# Patient Record
Sex: FEMALE | Race: BLACK OR AFRICAN AMERICAN | NOT HISPANIC OR LATINO | Employment: FULL TIME | URBAN - METROPOLITAN AREA
[De-identification: names, ages, dates, MRNs, and addresses within clinical notes are randomized per-mention and may not be internally consistent; named-entity substitution may affect disease eponyms.]

---

## 2020-12-18 ENCOUNTER — INITIAL PRENATAL (OUTPATIENT)
Dept: OBSTETRICS AND GYNECOLOGY | Facility: CLINIC | Age: 30
End: 2020-12-18

## 2020-12-18 VITALS — DIASTOLIC BLOOD PRESSURE: 73 MMHG | WEIGHT: 166 LBS | SYSTOLIC BLOOD PRESSURE: 110 MMHG

## 2020-12-18 DIAGNOSIS — Z34.90 PREGNANCY, UNSPECIFIED GESTATIONAL AGE: Primary | ICD-10-CM

## 2020-12-18 DIAGNOSIS — O21.9 NAUSEA AND VOMITING OF PREGNANCY, ANTEPARTUM: ICD-10-CM

## 2020-12-18 LAB
B-HCG UR QL: POSITIVE
GLUCOSE UR STRIP-MCNC: NEGATIVE MG/DL
INTERNAL NEGATIVE CONTROL: NEGATIVE
INTERNAL POSITIVE CONTROL: POSITIVE
Lab: ABNORMAL
PROT UR STRIP-MCNC: NEGATIVE MG/DL

## 2020-12-18 PROCEDURE — 0501F PRENATAL FLOW SHEET: CPT | Performed by: STUDENT IN AN ORGANIZED HEALTH CARE EDUCATION/TRAINING PROGRAM

## 2020-12-18 PROCEDURE — 81025 URINE PREGNANCY TEST: CPT | Performed by: STUDENT IN AN ORGANIZED HEALTH CARE EDUCATION/TRAINING PROGRAM

## 2020-12-18 RX ORDER — PROMETHAZINE HYDROCHLORIDE 25 MG/1
25 TABLET ORAL EVERY 6 HOURS PRN
Qty: 30 TABLET | Refills: 1 | Status: SHIPPED | OUTPATIENT
Start: 2020-12-18 | End: 2021-02-11

## 2020-12-18 RX ORDER — PNV NO.95/FERROUS FUM/FOLIC AC 28MG-0.8MG
1 TABLET ORAL DAILY
Qty: 30 TABLET | Refills: 11 | Status: SHIPPED | OUTPATIENT
Start: 2020-12-18 | End: 2021-02-11

## 2020-12-19 LAB
ABO GROUP BLD: NORMAL
BASOPHILS # BLD AUTO: 0 X10E3/UL (ref 0–0.2)
BASOPHILS NFR BLD AUTO: 0 %
BLD GP AB SCN SERPL QL: NEGATIVE
EOSINOPHIL # BLD AUTO: 0.1 X10E3/UL (ref 0–0.4)
EOSINOPHIL NFR BLD AUTO: 1 %
ERYTHROCYTE [DISTWIDTH] IN BLOOD BY AUTOMATED COUNT: 12.6 % (ref 11.7–15.4)
HBV SURFACE AG SERPL QL IA: NEGATIVE
HCT VFR BLD AUTO: 37.5 % (ref 34–46.6)
HCV AB S/CO SERPL IA: <0.1 S/CO RATIO (ref 0–0.9)
HGB BLD-MCNC: 12.7 G/DL (ref 11.1–15.9)
HIV 1+2 AB+HIV1 P24 AG SERPL QL IA: NON REACTIVE
IMM GRANULOCYTES # BLD AUTO: 0 X10E3/UL (ref 0–0.1)
IMM GRANULOCYTES NFR BLD AUTO: 0 %
LYMPHOCYTES # BLD AUTO: 2.5 X10E3/UL (ref 0.7–3.1)
LYMPHOCYTES NFR BLD AUTO: 38 %
MCH RBC QN AUTO: 29 PG (ref 26.6–33)
MCHC RBC AUTO-ENTMCNC: 33.9 G/DL (ref 31.5–35.7)
MCV RBC AUTO: 86 FL (ref 79–97)
MONOCYTES # BLD AUTO: 0.6 X10E3/UL (ref 0.1–0.9)
MONOCYTES NFR BLD AUTO: 9 %
NEUTROPHILS # BLD AUTO: 3.4 X10E3/UL (ref 1.4–7)
NEUTROPHILS NFR BLD AUTO: 52 %
PLATELET # BLD AUTO: 280 X10E3/UL (ref 150–450)
RBC # BLD AUTO: 4.38 X10E6/UL (ref 3.77–5.28)
RH BLD: POSITIVE
RPR SER QL: NON REACTIVE
RUBV IGG SERPL IA-ACNC: 9.12 INDEX
TSH SERPL DL<=0.005 MIU/L-ACNC: 0.38 UIU/ML (ref 0.45–4.5)
VZV IGG SER IA-ACNC: 1311 INDEX
WBC # BLD AUTO: 6.5 X10E3/UL (ref 3.4–10.8)

## 2020-12-20 LAB
BACTERIA UR CULT: ABNORMAL
BACTERIA UR CULT: ABNORMAL

## 2020-12-21 LAB
AMPHETAMINES UR QL SCN: NEGATIVE NG/ML
BARBITURATES UR QL SCN: NEGATIVE NG/ML
BENZODIAZ UR QL SCN: NEGATIVE NG/ML
BZE UR QL SCN: NEGATIVE NG/ML
CANNABINOIDS UR QL SCN: NEGATIVE NG/ML
CREAT UR-MCNC: 39.1 MG/DL (ref 20–300)
LABORATORY COMMENT REPORT: NORMAL
METHADONE UR QL SCN: NEGATIVE NG/ML
OPIATES UR QL SCN: NEGATIVE NG/ML
OXYCODONE+OXYMORPHONE UR QL SCN: NEGATIVE NG/ML
PCP UR QL: NEGATIVE NG/ML
PH UR: 6.5 [PH] (ref 4.5–8.9)
PROPOXYPH UR QL SCN: NEGATIVE NG/ML

## 2020-12-22 DIAGNOSIS — R79.89 LOW TSH LEVEL: Primary | ICD-10-CM

## 2020-12-22 LAB
C TRACH RRNA CVX QL NAA+PROBE: NEGATIVE
CYTOLOGIST CVX/VAG CYTO: NORMAL
CYTOLOGY CVX/VAG DOC CYTO: NORMAL
CYTOLOGY CVX/VAG DOC THIN PREP: NORMAL
DX ICD CODE: NORMAL
HIV 1 & 2 AB SER-IMP: NORMAL
HPV I/H RISK 4 DNA CVX QL PROBE+SIG AMP: NEGATIVE
N GONORRHOEA RRNA CVX QL NAA+PROBE: NEGATIVE
OTHER STN SPEC: NORMAL
STAT OF ADQ CVX/VAG CYTO-IMP: NORMAL

## 2020-12-31 PROBLEM — O21.9 NAUSEA AND VOMITING OF PREGNANCY, ANTEPARTUM: Status: ACTIVE | Noted: 2020-12-18

## 2020-12-31 PROBLEM — Z34.90 PREGNANCY: Status: ACTIVE | Noted: 2020-12-18

## 2021-01-04 ENCOUNTER — TELEPHONE (OUTPATIENT)
Dept: OBSTETRICS AND GYNECOLOGY | Facility: CLINIC | Age: 31
End: 2021-01-04

## 2021-01-04 DIAGNOSIS — O23.41 GROUP B STREPTOCOCCUS URINARY TRACT INFECTION AFFECTING PREGNANCY IN FIRST TRIMESTER: Primary | ICD-10-CM

## 2021-01-04 DIAGNOSIS — B95.1 GROUP B STREPTOCOCCUS URINARY TRACT INFECTION AFFECTING PREGNANCY IN FIRST TRIMESTER: Primary | ICD-10-CM

## 2021-01-04 RX ORDER — AMOXICILLIN 500 MG/1
500 CAPSULE ORAL 2 TIMES DAILY
Qty: 14 CAPSULE | Refills: 0 | Status: SHIPPED | OUTPATIENT
Start: 2021-01-04 | End: 2021-01-11

## 2021-01-04 NOTE — TELEPHONE ENCOUNTER
Attempted to contact patient regarding new OB labs but no answer. Left voicemail that the patient should call the office for results but her urine culture was positive for a UTI and a prescription for antibiotics was sent to her pharmacy. Will call back at later time.    Meliza Campos MD  1/4/2021  15:06 EST

## 2021-01-04 NOTE — TELEPHONE ENCOUNTER
Called patient regarding results from recent appointment using a Slovenian . Reviewed results from visit and discussed that she had a UTI needing treatment with amoxicillin 500 mg BID x 7 days. She also had a low TSH and was advised to come to the clinic for a blood draw for a free T4 level. Patient indicated understanding and all questions and concerns answered.    Meliza Campos MD  1/4/2021  15:22 EST

## 2021-01-14 ENCOUNTER — ROUTINE PRENATAL (OUTPATIENT)
Dept: OBSTETRICS AND GYNECOLOGY | Facility: CLINIC | Age: 31
End: 2021-01-14

## 2021-01-14 VITALS — WEIGHT: 166 LBS | DIASTOLIC BLOOD PRESSURE: 70 MMHG | SYSTOLIC BLOOD PRESSURE: 110 MMHG

## 2021-01-14 DIAGNOSIS — E05.90 SUBCLINICAL HYPERTHYROIDISM: ICD-10-CM

## 2021-01-14 DIAGNOSIS — O09.891 HISTORY OF GBS (GROUP B STREPTOCOCCUS) UTI, CURRENTLY PREGNANT, FIRST TRIMESTER: ICD-10-CM

## 2021-01-14 DIAGNOSIS — Z3A.12 12 WEEKS GESTATION OF PREGNANCY: Primary | ICD-10-CM

## 2021-01-14 DIAGNOSIS — Z87.440 HISTORY OF GBS (GROUP B STREPTOCOCCUS) UTI, CURRENTLY PREGNANT, FIRST TRIMESTER: ICD-10-CM

## 2021-01-14 DIAGNOSIS — Z34.91 NORMAL PREGNANCY, FIRST TRIMESTER: ICD-10-CM

## 2021-01-14 LAB
GLUCOSE UR STRIP-MCNC: NEGATIVE MG/DL
PROT UR STRIP-MCNC: ABNORMAL MG/DL

## 2021-01-14 PROCEDURE — 0502F SUBSEQUENT PRENATAL CARE: CPT | Performed by: STUDENT IN AN ORGANIZED HEALTH CARE EDUCATION/TRAINING PROGRAM

## 2021-01-14 NOTE — PROGRESS NOTES
Chief Complaint   Patient presents with   • Routine Prenatal Visit      Zulma Cordero is a 30 y.o.  at 12w6d who presents for routine prenatal visit. She has no complaints today. She denies vaginal bleeding, abdominal cramping, leakage of fluid. She has not yet felt fetal movement    /70   Wt 75.3 kg (166 lb)   LMP  (LMP Unknown)    Gen: well appearing, NAD  Abd: gravid, nontender  Ext: no lower extremity edema   See OB Flowsheet    ASSESSMENT:   1. IUP at 12w6d   2. Subclinical hyperthyroidism    3. History of UTI in pregnancy     PLAN:  See updated Problem List   Urine culture ordered for test of cure following UTI on initial urine culture.   Reviewed new OB labs with patient including low TSH and normal free T4 suggestive of subclinical hyperthyroidism. Recommend repeat TSH/free T4 every trimester.   Patient desires genetic testing with cell free DNA and discussed that her insurance may not cover testing. Patient desires testing to be ordered and will see if it is covered and if not the cost.   First trimester bleeding/pain precautions reviewed.  Return in about 4 weeks (around 2021) for prenatal visit .      Raissa [id number] American  used during encounter.    Meliza Campos MD

## 2021-01-16 LAB
BACTERIA UR CULT: NO GROWTH
BACTERIA UR CULT: NORMAL

## 2021-01-17 PROBLEM — E05.90 SUBCLINICAL HYPERTHYROIDISM: Status: ACTIVE | Noted: 2021-01-17

## 2021-01-17 PROBLEM — O09.899 HISTORY OF GBS (GROUP B STREPTOCOCCUS) UTI, CURRENTLY PREGNANT: Status: ACTIVE | Noted: 2021-01-17

## 2021-01-17 PROBLEM — Z87.440 HISTORY OF GBS (GROUP B STREPTOCOCCUS) UTI, CURRENTLY PREGNANT: Status: ACTIVE | Noted: 2021-01-17

## 2021-01-19 LAB
CFDNA.FET/CFDNA.TOTAL SFR FETUS: NORMAL %
CITATION REF LAB TEST: NORMAL
FET 13+18+21+X+Y ANEUP PLAS.CFDNA: NEGATIVE
FET CHR 21 TS PLAS.CFDNA QL: NEGATIVE
FET SEX PLAS.CFDNA DOSAGE CFDNA: NORMAL
FET TS 13 RISK PLAS.CFDNA QL: NEGATIVE
FET TS 18 RISK WBC.DNA+CFDNA QL: NEGATIVE
GA EST FROM CONCEPTION DATE: NORMAL D
GESTATIONAL AGE > 9:: YES
LAB DIRECTOR NAME PROVIDER: NORMAL
LAB DIRECTOR NAME PROVIDER: NORMAL
LABORATORY COMMENT REPORT: NORMAL
LIMITATIONS OF THE TEST: NORMAL
NEGATIVE PREDICTIVE VALUE: NORMAL
NOTE: NORMAL
PERFORMANCE CHARACTERISTICS: NORMAL
POSITIVE PREDICTIVE VALUE: NORMAL
REF LAB TEST METHOD: NORMAL
TEST PERFORMANCE INFO SPEC: NORMAL

## 2021-02-11 ENCOUNTER — ROUTINE PRENATAL (OUTPATIENT)
Dept: OBSTETRICS AND GYNECOLOGY | Facility: CLINIC | Age: 31
End: 2021-02-11

## 2021-02-11 VITALS — WEIGHT: 169 LBS | DIASTOLIC BLOOD PRESSURE: 69 MMHG | SYSTOLIC BLOOD PRESSURE: 124 MMHG

## 2021-02-11 DIAGNOSIS — Z3A.16 16 WEEKS GESTATION OF PREGNANCY: Primary | ICD-10-CM

## 2021-02-11 LAB
GLUCOSE UR STRIP-MCNC: ABNORMAL MG/DL
PROT UR STRIP-MCNC: ABNORMAL MG/DL

## 2021-02-11 PROCEDURE — 0502F SUBSEQUENT PRENATAL CARE: CPT | Performed by: STUDENT IN AN ORGANIZED HEALTH CARE EDUCATION/TRAINING PROGRAM

## 2021-02-11 RX ORDER — PNV NO.95/FERROUS FUM/FOLIC AC 28MG-0.8MG
1 TABLET ORAL DAILY
COMMUNITY
Start: 2020-12-18 | End: 2021-05-20 | Stop reason: SDUPTHER

## 2021-02-11 NOTE — PROGRESS NOTES
Chief Complaint   Patient presents with   • Routine Prenatal Visit      Zulma Cordero is a 30 y.o.  at 16w6d who presents for routine prenatal visit. She reports that she feels fatigued and has intermittent nausea. She denies vaginal bleeding, contractions, abdominal cramping or leakage of fluid. She has not yet felt fetal movement.    /69   Wt 76.7 kg (169 lb)   LMP  (LMP Unknown)    Gen: well appearing, NAD  Abd: gravid, nontender  Ext: no lower extremity edema   See OB Flowsheet    ASSESSMENT:   1. IUP at 16w6d   2. Subclinical hyperthyroidism   3. History of UTI in pregnancy- negative FCO at last visit    PLAN:  Problem list reviewed and updated.   Reviewed low risk cell free DNA testing.   Second trimester precautions reviewed including  labor precautions, anticipated fetal movements.   Will plan for AFP at next visit given no phlebotomist today.   Return in about 4 weeks (around 3/11/2021) for prenatal visit and anatomy ultrasound .    Patient Active Problem List    Diagnosis Date Noted   • Subclinical hyperthyroidism 2021   • History of GBS (group B streptococcus) UTI, currently pregnant 2021   • Pregnancy 2020   • Nausea and vomiting of pregnancy, antepartum 2020       Orders Placed This Encounter   Procedures   • POC Urinalysis Dipstick     This is an external result entered through the Results Console.     Belarusian  used during encounter.    Meliza Campos MD

## 2021-03-11 ENCOUNTER — ROUTINE PRENATAL (OUTPATIENT)
Dept: OBSTETRICS AND GYNECOLOGY | Facility: CLINIC | Age: 31
End: 2021-03-11

## 2021-03-11 VITALS — WEIGHT: 172 LBS | SYSTOLIC BLOOD PRESSURE: 120 MMHG | DIASTOLIC BLOOD PRESSURE: 72 MMHG

## 2021-03-11 DIAGNOSIS — Z3A.20 20 WEEKS GESTATION OF PREGNANCY: Primary | ICD-10-CM

## 2021-03-11 DIAGNOSIS — E05.90 SUBCLINICAL HYPERTHYROIDISM: ICD-10-CM

## 2021-03-11 LAB
GLUCOSE UR STRIP-MCNC: NEGATIVE MG/DL
PROT UR STRIP-MCNC: NEGATIVE MG/DL

## 2021-03-11 PROCEDURE — 0502F SUBSEQUENT PRENATAL CARE: CPT | Performed by: STUDENT IN AN ORGANIZED HEALTH CARE EDUCATION/TRAINING PROGRAM

## 2021-03-11 NOTE — PROGRESS NOTES
Chief Complaint   Patient presents with   • Routine Prenatal Visit      Zulma Cordero is a 30 y.o.  at 20w6d who presents for routine prenatal visit. She has no complaints today and reports doing well. She denies vaginal bleeding, cramping, contractions, LOF. She feels fetal movements.     /72   Wt 78 kg (172 lb)   LMP  (LMP Unknown)    Gen: well appearing, NAD  Abd: gravid, nontender  See OB Flowsheet    ASSESSMENT:   1. IUP at 20w6d   2. Subclinical hyperthyroidism     PLAN:  Problem list reviewed and updated.  Patient underwent normal anatomy ultrasound today.   Will obtain repeat TSH and free T4 in second trimester to evaluate for hyperthyroidism.   Second trimester precautions reviewed including  labor precautions, anticipated fetal movements.  Reviewed AFP and patient declined.   Return in about 4 weeks (around 2021) for prenatal visit .    Patient Active Problem List    Diagnosis Date Noted   • Subclinical hyperthyroidism 2021   • History of GBS (group B streptococcus) UTI, currently pregnant 2021   • Pregnancy 2020   • Nausea and vomiting of pregnancy, antepartum 2020       Orders Placed This Encounter   Procedures   • TSH     Order Specific Question:   [facility] Has the patient fasted?     Answer:   No   • T4, Free     Order Specific Question:   [facility] Has the patient fasted?     Answer:   No   • POC Urinalysis Dipstick     This is an external result entered through the Results Console.     Video Bolivian  used during encounter.    Meliza Campos MD

## 2021-03-12 LAB
T4 FREE SERPL-MCNC: 0.98 NG/DL (ref 0.93–1.7)
TSH SERPL DL<=0.005 MIU/L-ACNC: 2.17 UIU/ML (ref 0.27–4.2)

## 2021-04-08 ENCOUNTER — ROUTINE PRENATAL (OUTPATIENT)
Dept: OBSTETRICS AND GYNECOLOGY | Facility: CLINIC | Age: 31
End: 2021-04-08

## 2021-04-08 VITALS — SYSTOLIC BLOOD PRESSURE: 139 MMHG | WEIGHT: 176 LBS | DIASTOLIC BLOOD PRESSURE: 78 MMHG

## 2021-04-08 DIAGNOSIS — Z3A.24 24 WEEKS GESTATION OF PREGNANCY: Primary | ICD-10-CM

## 2021-04-08 PROBLEM — O21.9 NAUSEA AND VOMITING OF PREGNANCY, ANTEPARTUM: Status: RESOLVED | Noted: 2020-12-18 | Resolved: 2021-04-08

## 2021-04-08 LAB
GLUCOSE UR STRIP-MCNC: NEGATIVE MG/DL
PROT UR STRIP-MCNC: ABNORMAL MG/DL

## 2021-04-08 PROCEDURE — 99213 OFFICE O/P EST LOW 20 MIN: CPT | Performed by: STUDENT IN AN ORGANIZED HEALTH CARE EDUCATION/TRAINING PROGRAM

## 2021-04-08 NOTE — PROGRESS NOTES
Chief Complaint   Patient presents with   • Routine Prenatal Visit      Zulma Cordero is a 30 y.o.  at 24w6d who presents for routine prenatal visit. She reports doing well today. Denies vaginal bleeding, cramping, contractions, LOF. She reports active fetal movement.     She denies headache, vision changes, shortness of breath, chest pain or abdominal pain.      /78   Wt 79.8 kg (176 lb)   LMP  (LMP Unknown)    Gen: well appearing, NAD  Abd: gravid, nontender  Ext: no lower extremity edema   FHR 140s  See OB Flowsheet    ASSESSMENT:   1. IUP at 24w6d   2. Subclinical hypothyroidism- resolved     PLAN:  Problem list reviewed and updated.   I reviewed thyroid labs from previous visit that demonstrates normal TSH and free T4. I discussed that her thyroid levels are normal at this time. We will plan to repeat at next visit with 1 h GTT labs and if normal, I recommend no further follow up unless indicated.   We reviewed gestational diabetes testing to be done at next appointment.   Second trimester precautions reviewed including  labor precautions, anticipated fetal movements.  All questions and concerns answered.   Follow up in 4 weeks for prenatal visit and 1h GTT.     Patient Active Problem List    Diagnosis Date Noted   • Subclinical hyperthyroidism 2021   • History of GBS (group B streptococcus) UTI, currently pregnant 2021   • Pregnancy 2020   • Nausea and vomiting of pregnancy, antepartum 2020       Orders Placed This Encounter   Procedures   • POC Urinalysis Dipstick     This is an external result entered through the Results Console.     Order Specific Question:   Release to patient     Answer:   Immediate     Qatari  used during encounter.     Meliza Campos MD

## 2021-04-16 ENCOUNTER — BULK ORDERING (OUTPATIENT)
Dept: CASE MANAGEMENT | Facility: OTHER | Age: 31
End: 2021-04-16

## 2021-04-16 DIAGNOSIS — Z23 IMMUNIZATION DUE: ICD-10-CM

## 2021-05-06 ENCOUNTER — ROUTINE PRENATAL (OUTPATIENT)
Dept: OBSTETRICS AND GYNECOLOGY | Facility: CLINIC | Age: 31
End: 2021-05-06

## 2021-05-06 VITALS — WEIGHT: 176 LBS | SYSTOLIC BLOOD PRESSURE: 120 MMHG | DIASTOLIC BLOOD PRESSURE: 76 MMHG

## 2021-05-06 DIAGNOSIS — Z34.80 SUPERVISION OF OTHER NORMAL PREGNANCY, ANTEPARTUM: ICD-10-CM

## 2021-05-06 DIAGNOSIS — Z3A.28 28 WEEKS GESTATION OF PREGNANCY: Primary | ICD-10-CM

## 2021-05-06 DIAGNOSIS — R12 HEART BURN: ICD-10-CM

## 2021-05-06 DIAGNOSIS — E03.8 SUBCLINICAL HYPOTHYROIDISM: ICD-10-CM

## 2021-05-06 LAB
ERYTHROCYTE [DISTWIDTH] IN BLOOD BY AUTOMATED COUNT: 12.5 % (ref 12.3–15.4)
GLUCOSE UR STRIP-MCNC: NEGATIVE MG/DL
HCT VFR BLD AUTO: 34.1 % (ref 34–46.6)
HGB BLD-MCNC: 11.5 G/DL (ref 12–15.9)
MCH RBC QN AUTO: 29.2 PG (ref 26.6–33)
MCHC RBC AUTO-ENTMCNC: 33.7 G/DL (ref 31.5–35.7)
MCV RBC AUTO: 86.5 FL (ref 79–97)
PLATELET # BLD AUTO: 188 10*3/MM3 (ref 140–450)
PROT UR STRIP-MCNC: NEGATIVE MG/DL
RBC # BLD AUTO: 3.94 10*6/MM3 (ref 3.77–5.28)
WBC # BLD AUTO: 9.76 10*3/MM3 (ref 3.4–10.8)

## 2021-05-06 PROCEDURE — 0502F SUBSEQUENT PRENATAL CARE: CPT | Performed by: STUDENT IN AN ORGANIZED HEALTH CARE EDUCATION/TRAINING PROGRAM

## 2021-05-06 RX ORDER — FAMOTIDINE 20 MG/1
20 TABLET, FILM COATED ORAL DAILY
Qty: 30 TABLET | Refills: 1 | Status: SHIPPED | OUTPATIENT
Start: 2021-05-06 | End: 2022-05-06

## 2021-05-07 LAB
GLUCOSE 1H P 50 G GLC PO SERPL-MCNC: 126 MG/DL (ref 65–139)
TSH SERPL DL<=0.005 MIU/L-ACNC: 2 UIU/ML (ref 0.27–4.2)

## 2021-05-20 ENCOUNTER — ROUTINE PRENATAL (OUTPATIENT)
Dept: OBSTETRICS AND GYNECOLOGY | Facility: CLINIC | Age: 31
End: 2021-05-20

## 2021-05-20 ENCOUNTER — TELEPHONE (OUTPATIENT)
Dept: OBSTETRICS AND GYNECOLOGY | Facility: CLINIC | Age: 31
End: 2021-05-20

## 2021-05-20 VITALS — SYSTOLIC BLOOD PRESSURE: 116 MMHG | DIASTOLIC BLOOD PRESSURE: 70 MMHG | WEIGHT: 179 LBS

## 2021-05-20 DIAGNOSIS — Z3A.30 30 WEEKS GESTATION OF PREGNANCY: Primary | ICD-10-CM

## 2021-05-20 LAB
GLUCOSE UR STRIP-MCNC: NEGATIVE MG/DL
PROT UR STRIP-MCNC: ABNORMAL MG/DL

## 2021-05-20 PROCEDURE — 0502F SUBSEQUENT PRENATAL CARE: CPT | Performed by: STUDENT IN AN ORGANIZED HEALTH CARE EDUCATION/TRAINING PROGRAM

## 2021-05-20 RX ORDER — PNV NO.95/FERROUS FUM/FOLIC AC 28MG-0.8MG
1 TABLET ORAL DAILY
Qty: 30 TABLET | Refills: 3 | Status: SHIPPED | OUTPATIENT
Start: 2021-05-20

## 2021-05-20 NOTE — TELEPHONE ENCOUNTER
Hello,    Patient is requesting Prenatal Vit-Fe Fumarate-FA (prenatal vitamin 28-0.8) 28-0.8 MG tablet tablet. Pharmacy confirmed:  Pharmacy:   [facility][id number] [address] AT [facility] & [address] - [phone] PH - [phone] FX    Thank you! Pharmacy:   [facility] #[id number] [address] AT [facility] & [address] - [phone] PH - [phone] FX  .

## 2021-05-31 PROBLEM — E05.90 SUBCLINICAL HYPERTHYROIDISM: Status: RESOLVED | Noted: 2021-01-17 | Resolved: 2021-05-31

## 2022-09-12 NOTE — PROGRESS NOTES
Initial OB Visit    Chief Complaint   Patient presents with   • Initial Prenatal Visit        Zulma Cordero is being seen today for her first obstetrical visit.  She is a 30 y.o.    Unknown gestation by LMP that finished on 10/17/20, she is unsure of the first date.     This is a planned pregnancy.   Patient has delivered all other of her pregnancies in [place], her home country.   OB History    Para Term  AB Living   4 3 3         SAB TAB Ectopic Molar Multiple Live Births                    # Outcome Date GA Lbr Chad/2nd Weight Sex Delivery Anes PTL Lv   4 Current            3 Term 19 40w0d   M Vag-Spont      2 Term 10/08/14 40w0d   M Vag-Spont      1 Term 08 40w0d   F Vag-Spont        Current obstetric complaints: She has complains of daily nausea and intermittent vomiting. She also reports of feeling cold blooded.  She denies vaginal bleeding, abdominal cramping.   Prior obstetric issues, potential pregnancy concerns: Denies history of gestational hypertension, gestational diabetes, shoulder dystocia or postpartum hemorrhage.   Family history of genetic issues (includes FOB): denies   Prior infections concerning in pregnancy (Rash, fever since LMP): denies   Varicella Hx:denies  Prior genetic testing: denies   History of abnormal pap smears: n/a  History of STIs: denies History of HSV in self or partner? Denies   Prepregnancy weight 58-60 kg     History reviewed. No pertinent past medical history.    History reviewed. No pertinent surgical history.      Current Outpatient Medications:   •  Prenatal Vit-Fe Fumarate-FA (Prenatal Vitamin) 27-0.8 MG tablet, Take 1 tablet by mouth Daily., Disp: 30 tablet, Rfl: 11  •  promethazine (PHENERGAN) 25 MG tablet, Take 1 tablet by mouth Every 6 (Six) Hours As Needed for Nausea or Vomiting., Disp: 30 tablet, Rfl: 1    No Known Allergies    Social History     Socioeconomic History   • Marital status: Unknown     Spouse name: Not on file   •  Number of children: Not on file   • Years of education: Not on file   • Highest education level: Not on file   Tobacco Use   • Smoking status: Never Smoker   • Smokeless tobacco: Never Used   Substance and Sexual Activity   • Alcohol use: Never     Frequency: Never   • Drug use: Never       History reviewed. No pertinent family history.    Review of systems     Constitutional: negative for chills, fevers and for fatigue  Eyes: negative  Ears, nose, mouth, throat, and face: negative for hearing loss and nasal congestion  Respiratory: negative for asthma and wheezing  Cardiovascular: negative for chest pain and dyspnea  Gastrointestinal: negative for dyspepsia, dysphagia abdominal pain  Genitourinary:negative for urinary incontinence  Integument/breast: negative for breast lump  Hematologic/lymphatic: negative for bleeding  Musculoskeletal:negative for aches  Neurological: negative for numbness/tingling  Behavioral/Psych: negative for anhedonia  Allergic/Immunologic: negative for rash, allergy         Objective    /73   Wt 75.3 kg (166 lb)   LMP  (LMP Unknown)       General Appearance:    Alert, cooperative, in no acute distress   Head:    Normocephalic, without obvious abnormality, atraumatic   Eyes:            Lids and lashes normal, conjunctivae and sclerae normal, no   icterus, no pallor, corneas clear   Ears:    Ears appear intact with no abnormalities noted       Neck:   No adenopathy, supple, trachea midline, no thyromegaly   Back:     No kyphosis present, no scoliosis present,                       Lungs:     No increased work of breathing, regular respirations     Heart:    Regular rhythm and normal rate   Breast Exam:    No masses, No nipple discharge   Abdomen:     Normal bowel sounds, no masses, no organomegaly, soft        non-tender, non-distended, no guarding, no rebound                 tenderness   Genitalia:    Vulva - BUS-WNL, NEFG    Vagina - No discharge, No bleeding    Cervix - No Lesions,  closed     Uterus - Consistent with 8 weeks    Adnexa - No masses, NT    Pelvimetry - clinically adequate, gynecoid pelvis     Extremities:   Moves all extremities well, no edema, no cyanosis, no              redness   Pulses:   Pulses palpable and equal bilaterally   Skin:   No bleeding, bruising or rash   Lymph nodes:   No palpable adenopathy   Neurologic:   Sensation intact, A&O times 3      US today demonstrated SIUP at 9w0d by CRL with  bpm.     Assessment  1. Pregnancy at 9w0d by CRL   2. Cervical cancer screening     Plan    Initial labs drawn, GC/CHL screen done.  Dating ultrasound performed today.   Pap smear with cotesting ordered today.   TSH ordered today for screening for hypothyroidism.   Patient prescribed prenatal vitamin.   Problem list reviewed and updated.  Reviewed routine prenatal care with the office to include but not limited to:   Zika (travel restrictions/ok to use insect repellant), not to changing cat litter, food restrictions, avoidance of alcohol, tobacco and drugs and saunas/hot tubs, anticipated weight gain/nutrition requirements.  Reviewed nature of practice and hospital.  Reviewed recommended follow up, importance of compliance with care. We reviewed testing in pregnancy including HIV testing and urine drug screen.    Reviewed aneuploidy screening and CF/SMA screening.  We reviewed limitations of testing, possibility of false positive/negative results, possible need for other tests as indicated.  For contraception after this pregnancy, she is unsure at this time.   Counseled on limitations of ultrasound in pregnancy in detecting aneuploidy/fetal anomalies    We reviewed that at this time, her BMI is classified as BMI 18.5-24.9       Classification: normal weight.  We reviewed that in pregnancy, her recommended weight gain is 25-35 lbs.  In the first trimester, caloric demand is typically not increased.  In the second and third trimester, the increased demand is approximately  350 and 450 calories respectively.    We discussed first line treatment of nausea and vomiting includes vitamin B6/Unisom that she can obtain over the counter. Prescribed phenergan if patient does note improvement with first line therapy.     All questions answered.     Return in about 4 weeks (around 1/15/2021) for prenatal visit .      Meliza Campos MD     Negative